# Patient Record
Sex: FEMALE | Race: ASIAN | URBAN - METROPOLITAN AREA
[De-identification: names, ages, dates, MRNs, and addresses within clinical notes are randomized per-mention and may not be internally consistent; named-entity substitution may affect disease eponyms.]

---

## 2019-07-13 ENCOUNTER — OFFICE VISIT (OUTPATIENT)
Dept: FAMILY MEDICINE CLINIC | Facility: CLINIC | Age: 34
End: 2019-07-13
Payer: COMMERCIAL

## 2019-07-13 VITALS
HEART RATE: 76 BPM | SYSTOLIC BLOOD PRESSURE: 118 MMHG | RESPIRATION RATE: 16 BRPM | DIASTOLIC BLOOD PRESSURE: 70 MMHG | TEMPERATURE: 98 F

## 2019-07-13 DIAGNOSIS — E11.9 TYPE 2 DIABETES MELLITUS WITHOUT COMPLICATION, WITHOUT LONG-TERM CURRENT USE OF INSULIN (HCC): Primary | ICD-10-CM

## 2019-07-13 PROCEDURE — 99202 OFFICE O/P NEW SF 15 MIN: CPT | Performed by: NURSE PRACTITIONER

## 2019-07-13 NOTE — PROGRESS NOTES
Joelle Garcia is a 26year old female. HPI:   Patient presents with:  Medication Request: Emergency fill   presents to the clinic requesting a refill of medication for his wife.   States that they just came back from [facility] and his wife left her Met[facility] without complication, without long-term current use of insulin [facility])    Emergency fill for 1 week. Pt scheduled to establish with an Yasmine Sanchez Provider at 2:45pm on Monday 7/15/19  - metFORMIN HCl 500 MG Oral Tab;  Take 1 tablet (500 mg total) by mouth 2